# Patient Record
Sex: FEMALE | Race: OTHER | HISPANIC OR LATINO
[De-identification: names, ages, dates, MRNs, and addresses within clinical notes are randomized per-mention and may not be internally consistent; named-entity substitution may affect disease eponyms.]

---

## 2019-06-05 ENCOUNTER — APPOINTMENT (OUTPATIENT)
Dept: FAMILY MEDICINE | Facility: CLINIC | Age: 54
End: 2019-06-05
Payer: COMMERCIAL

## 2019-06-05 VITALS
HEIGHT: 62 IN | DIASTOLIC BLOOD PRESSURE: 80 MMHG | WEIGHT: 156 LBS | SYSTOLIC BLOOD PRESSURE: 120 MMHG | BODY MASS INDEX: 28.71 KG/M2

## 2019-06-05 DIAGNOSIS — Z78.9 OTHER SPECIFIED HEALTH STATUS: ICD-10-CM

## 2019-06-05 PROCEDURE — 99058 OFFICE EMERGENCY CARE: CPT

## 2019-06-05 PROCEDURE — 99213 OFFICE O/P EST LOW 20 MIN: CPT | Mod: 25

## 2019-06-05 RX ORDER — UBIDECARENONE/VIT E ACET 100MG-5
25 MCG CAPSULE ORAL
Refills: 0 | Status: ACTIVE | COMMUNITY

## 2019-06-05 RX ORDER — UBIDECARENONE/VIT E ACET 100MG-5
25 MCG CAPSULE ORAL
Refills: 0 | Status: COMPLETED | COMMUNITY
End: 2019-06-05

## 2019-06-05 NOTE — PHYSICAL EXAM
[No Acute Distress] : no acute distress [Well Nourished] : well nourished [Well Developed] : well developed [Well-Appearing] : well-appearing [Normal Sclera/Conjunctiva] : normal sclera/conjunctiva [PERRL] : pupils equal round and reactive to light [EOMI] : extraocular movements intact [Normal Outer Ear/Nose] : the outer ears and nose were normal in appearance [No Respiratory Distress] : no respiratory distress  [Clear to Auscultation] : lungs were clear to auscultation bilaterally [No Accessory Muscle Use] : no accessory muscle use [Normal Rate] : normal rate  [Regular Rhythm] : with a regular rhythm [Normal S1, S2] : normal S1 and S2 [Normal Gait] : normal gait [de-identified] : Pt has what appears to be .5-1cm insect bites on her upper back, more concentrated on her left back, no comedomes present

## 2019-06-05 NOTE — ASSESSMENT
[FreeTextEntry1] : Pt is a 55 yo F with c/o of itchy spots on her back that started 3 days ago. This is likely 2/2 to insect bites due to the appearance of the spots and the fact that they are pruritic.

## 2019-06-05 NOTE — REVIEW OF SYSTEMS
[Constipation] : constipation [Itching] : Itching [Skin Rash] : skin rash [Negative] : Heme/Lymph [FreeTextEntry7] : pt complains of chronic bloating and constipation

## 2019-06-05 NOTE — HISTORY OF PRESENT ILLNESS
[FreeTextEntry8] : 55 yo F presents with c/o of a rash on her back that is itchy. Pt says she noticed that a spot on her left shoulder began to itch 3 days ago and over the course of the next few days several more spots began to itch. When her  looked, he saw several small spots on her back. The pt says that she has been unable to sleep due to how itchy the spots have been. Pt denies any recent changes to her detergent or lotion. Pt denies any hotel stays. Pt denies seeing any insects. No other family members have similar complaints.

## 2019-06-05 NOTE — PLAN
[FreeTextEntry1] : Pt should investigate possible sources of the insects in her house. \par Take hydroxyzine as prescribed to alleviate pruritus.

## 2019-06-06 ENCOUNTER — RECORD ABSTRACTING (OUTPATIENT)
Age: 54
End: 2019-06-06

## 2019-06-06 DIAGNOSIS — Z98.890 OTHER SPECIFIED POSTPROCEDURAL STATES: ICD-10-CM

## 2019-06-19 ENCOUNTER — APPOINTMENT (OUTPATIENT)
Dept: FAMILY MEDICINE | Facility: CLINIC | Age: 54
End: 2019-06-19

## 2019-07-11 ENCOUNTER — NON-APPOINTMENT (OUTPATIENT)
Age: 54
End: 2019-07-11

## 2019-07-11 ENCOUNTER — APPOINTMENT (OUTPATIENT)
Dept: FAMILY MEDICINE | Facility: CLINIC | Age: 54
End: 2019-07-11
Payer: COMMERCIAL

## 2019-07-11 VITALS
DIASTOLIC BLOOD PRESSURE: 62 MMHG | SYSTOLIC BLOOD PRESSURE: 122 MMHG | BODY MASS INDEX: 27.6 KG/M2 | OXYGEN SATURATION: 97 % | WEIGHT: 150 LBS | HEART RATE: 71 BPM | HEIGHT: 62 IN
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
## 2019-07-11 DIAGNOSIS — R21 RASH AND OTHER NONSPECIFIC SKIN ERUPTION: ICD-10-CM

## 2019-07-11 PROCEDURE — 99396 PREV VISIT EST AGE 40-64: CPT | Mod: 25

## 2019-07-11 PROCEDURE — 93000 ELECTROCARDIOGRAM COMPLETE: CPT

## 2019-07-11 PROCEDURE — 36415 COLL VENOUS BLD VENIPUNCTURE: CPT

## 2019-07-11 RX ORDER — LEVOTHYROXINE SODIUM 100 UG/1
100 TABLET ORAL
Refills: 0 | Status: DISCONTINUED | COMMUNITY
End: 2019-07-11

## 2019-07-11 RX ORDER — METHYLPREDNISOLONE 4 MG/1
4 TABLET ORAL
Refills: 0 | Status: DISCONTINUED | COMMUNITY
End: 2019-07-11

## 2019-07-11 RX ORDER — AZITHROMYCIN DIHYDRATE 250 MG/1
250 TABLET, FILM COATED ORAL
Refills: 0 | Status: DISCONTINUED | COMMUNITY
End: 2019-07-11

## 2019-07-11 NOTE — HISTORY OF PRESENT ILLNESS
[FreeTextEntry1] : Annual examination\par Dyspepsia [de-identified] : Reports episodes of abdominal bloating\par also necK and right shoulder pain

## 2019-07-11 NOTE — HEALTH RISK ASSESSMENT
[No falls in past year] : Patient reported no falls in the past year [With Family] : lives with family [Unemployed] : unemployed [] :  [High School] : high school [Sexually Active] : sexually active [Feels Safe at Home] : Feels safe at home [0] : 2) Feeling down, depressed, or hopeless: Not at all (0) [Patient reported PAP Smear was normal] : Patient reported PAP Smear was normal [HIV test declined] : HIV test declined [Hepatitis C test declined] : Hepatitis C test declined [Very Good] : ~his/her~  mood as very good [] : No [Yes] : Yes [2 - 4 times a month (2 pts)] : 2-4 times a month (2 points) [1 or 2 (0 pts)] : 1 or 2 (0 points) [[id number]] : 0 [Patient reported colonoscopy was normal] : Patient reported colonoscopy was normal [PapSmearDate] : 2018 [MammogramDate] : 2018 [ColonoscopyDate] : 2016 [ColonoscopyComments] : [facility] [Patient/Caregiver not ready to engage] : Patient/Caregiver not ready to engage

## 2019-07-11 NOTE — PHYSICAL EXAM
[No Acute Distress] : no acute distress [Well Nourished] : well nourished [Well Developed] : well developed [Well-Appearing] : well-appearing [Normal Sclera/Conjunctiva] : normal sclera/conjunctiva [PERRL] : pupils equal round and reactive to light [EOMI] : extraocular movements intact [Normal Outer Ear/Nose] : the outer ears and nose were normal in appearance [Normal Oropharynx] : the oropharynx was normal [No Lymphadenopathy] : no lymphadenopathy [No JVD] : no jugular venous distention [Supple] : supple [Thyroid Normal, No Nodules] : the thyroid was normal and there were no nodules present [No Respiratory Distress] : no respiratory distress  [Clear to Auscultation] : lungs were clear to auscultation bilaterally [No Accessory Muscle Use] : no accessory muscle use [Normal Rate] : normal rate  [Regular Rhythm] : with a regular rhythm [No Murmur] : no murmur heard [Normal S1, S2] : normal S1 and S2 [No Abdominal Bruit] : a ~M bruit was not heard ~T in the abdomen [No Carotid Bruits] : no carotid bruits [Pedal Pulses Present] : the pedal pulses are present [No Varicosities] : no varicosities [No Edema] : there was no peripheral edema [No Palpable Aorta] : no palpable aorta [No Extremity Clubbing/Cyanosis] : no extremity clubbing/cyanosis [Soft] : abdomen soft [Non Tender] : non-tender [Non-distended] : non-distended [No HSM] : no HSM [No Masses] : no abdominal mass palpated [Normal Posterior Cervical Nodes] : no posterior cervical lymphadenopathy [Normal Bowel Sounds] : normal bowel sounds [Normal Anterior Cervical Nodes] : no anterior cervical lymphadenopathy [No CVA Tenderness] : no CVA  tenderness [No Joint Swelling] : no joint swelling [No Spinal Tenderness] : no spinal tenderness [Grossly Normal Strength/Tone] : grossly normal strength/tone [No Rash] : no rash [No Focal Deficits] : no focal deficits [Coordination Grossly Intact] : coordination grossly intact [Normal Gait] : normal gait [Deep Tendon Reflexes (DTR)] : deep tendon reflexes were 2+ and symmetric [Normal Affect] : the affect was normal [Normal Insight/Judgement] : insight and judgment were intact

## 2019-07-11 NOTE — PLAN
[FreeTextEntry1] : Overall unremarkable examination\par recommended to see GI, likely will need upper endoscopy\par Will contact the patient with blood test results

## 2019-07-12 LAB
25(OH)D3 SERPL-MCNC: 46.3 NG/ML
ALBUMIN SERPL ELPH-MCNC: 4.2 G/DL
ALP BLD-CCNC: 103 U/L
ALT SERPL-CCNC: 14 U/L
ANION GAP SERPL CALC-SCNC: 11 MMOL/L
AST SERPL-CCNC: 16 U/L
BASOPHILS # BLD AUTO: 0.02 K/UL
BASOPHILS NFR BLD AUTO: 0.4 %
BILIRUB SERPL-MCNC: 0.3 MG/DL
BUN SERPL-MCNC: 12 MG/DL
CALCIUM SERPL-MCNC: 9.7 MG/DL
CHLORIDE SERPL-SCNC: 105 MMOL/L
CHOLEST SERPL-MCNC: 179 MG/DL
CHOLEST/HDLC SERPL: 3.4 RATIO
CO2 SERPL-SCNC: 25 MMOL/L
CREAT SERPL-MCNC: 0.69 MG/DL
EOSINOPHIL # BLD AUTO: 0.07 K/UL
EOSINOPHIL NFR BLD AUTO: 1.5 %
GLUCOSE SERPL-MCNC: 93 MG/DL
HCT VFR BLD CALC: 39.3 %
HDLC SERPL-MCNC: 53 MG/DL
HGB BLD-MCNC: 12.7 G/DL
IMM GRANULOCYTES NFR BLD AUTO: 0.2 %
LDLC SERPL CALC-MCNC: 104 MG/DL
LYMPHOCYTES # BLD AUTO: 2.14 K/UL
LYMPHOCYTES NFR BLD AUTO: 45.1 %
MAN DIFF?: NORMAL
MCHC RBC-ENTMCNC: 29.4 PG
MCHC RBC-ENTMCNC: 32.3 GM/DL
MCV RBC AUTO: 91 FL
MONOCYTES # BLD AUTO: 0.44 K/UL
MONOCYTES NFR BLD AUTO: 9.3 %
NEUTROPHILS # BLD AUTO: 2.07 K/UL
NEUTROPHILS NFR BLD AUTO: 43.5 %
PLATELET # BLD AUTO: 258 K/UL
POTASSIUM SERPL-SCNC: 4 MMOL/L
PROT SERPL-MCNC: 7.2 G/DL
RBC # BLD: 4.32 M/UL
RBC # FLD: 13.2 %
SODIUM SERPL-SCNC: 141 MMOL/L
TRIGL SERPL-MCNC: 110 MG/DL
TSH SERPL-ACNC: 2.76 UIU/ML
VIT B12 SERPL-MCNC: 613 PG/ML
WBC # FLD AUTO: 4.75 K/UL

## 2019-07-17 ENCOUNTER — APPOINTMENT (OUTPATIENT)
Dept: FAMILY MEDICINE | Facility: CLINIC | Age: 54
End: 2019-07-17

## 2020-09-09 RX ORDER — PANTOPRAZOLE 40 MG/1
40 TABLET, DELAYED RELEASE ORAL
Qty: 30 | Refills: 5 | Status: ACTIVE | COMMUNITY
Start: 2020-01-17 | End: 1900-01-01

## 2021-01-14 ENCOUNTER — RESULT REVIEW (OUTPATIENT)
Age: 56
End: 2021-01-14

## 2021-01-27 ENCOUNTER — RESULT REVIEW (OUTPATIENT)
Age: 56
End: 2021-01-27

## 2021-07-10 ENCOUNTER — APPOINTMENT (OUTPATIENT)
Dept: FAMILY MEDICINE | Facility: CLINIC | Age: 56
End: 2021-07-10
Payer: MEDICAID

## 2021-07-10 VITALS
TEMPERATURE: 97.7 F | BODY MASS INDEX: 27.6 KG/M2 | SYSTOLIC BLOOD PRESSURE: 118 MMHG | WEIGHT: 150 LBS | OXYGEN SATURATION: 98 % | DIASTOLIC BLOOD PRESSURE: 60 MMHG | HEART RATE: 78 BPM | HEIGHT: 62 IN

## 2021-07-10 DIAGNOSIS — R79.9 ABNORMAL FINDING OF BLOOD CHEMISTRY, UNSPECIFIED: ICD-10-CM

## 2021-07-10 PROCEDURE — G0442 ANNUAL ALCOHOL SCREEN 15 MIN: CPT

## 2021-07-10 PROCEDURE — 99396 PREV VISIT EST AGE 40-64: CPT

## 2021-07-10 PROCEDURE — G0444 DEPRESSION SCREEN ANNUAL: CPT | Mod: 59

## 2021-07-10 NOTE — HISTORY OF PRESENT ILLNESS
[FreeTextEntry1] : Annual examination\par Dyspepsia [de-identified] : Reports episodes of abdominal bloating\par Lower back, also neck and right shoulder pain\par Completed Covid vaccination

## 2021-07-10 NOTE — HEALTH RISK ASSESSMENT
[Good] : ~his/her~  mood as  good [Yes] : Yes [2 - 4 times a month (2 pts)] : 2-4 times a month (2 points) [1 or 2 (0 pts)] : 1 or 2 (0 points) [No falls in past year] : Patient reported no falls in the past year [0] : 1) Little interest or pleasure doing things: Not at all (0) [1] : 2) Feeling down, depressed, or hopeless for several days (1) [Patient reported mammogram was normal] : Patient reported mammogram was normal [Patient reported PAP Smear was normal] : Patient reported PAP Smear was normal [Patient reported colonoscopy was normal] : Patient reported colonoscopy was normal [HIV test declined] : HIV test declined [Hepatitis C test declined] : Hepatitis C test declined [Fully functional (bathing, dressing, toileting, transferring, walking, feeding)] : Fully functional (bathing, dressing, toileting, transferring, walking, feeding) [Fully functional (using the telephone, shopping, preparing meals, housekeeping, doing laundry, using] : Fully functional and needs no help or supervision to perform IADLs (using the telephone, shopping, preparing meals, housekeeping, doing laundry, using transportation, managing medications and managing finances) [] : No [Audit-CScore] : 2 [FQQ3Astyh] : 1 [Reports changes in hearing] : Reports no changes in hearing [MammogramDate] : 01/21 [PapSmearDate] : 01/21

## 2021-07-12 LAB
25(OH)D3 SERPL-MCNC: 38.7 NG/ML
ALBUMIN SERPL ELPH-MCNC: 4.4 G/DL
ALP BLD-CCNC: 117 U/L
ALT SERPL-CCNC: 17 U/L
ANION GAP SERPL CALC-SCNC: 11 MMOL/L
AST SERPL-CCNC: 20 U/L
BASOPHILS # BLD AUTO: 0.03 K/UL
BASOPHILS NFR BLD AUTO: 0.7 %
BILIRUB SERPL-MCNC: 0.4 MG/DL
BUN SERPL-MCNC: 15 MG/DL
CALCIUM SERPL-MCNC: 9.4 MG/DL
CHLORIDE SERPL-SCNC: 107 MMOL/L
CHOLEST SERPL-MCNC: 197 MG/DL
CO2 SERPL-SCNC: 21 MMOL/L
CREAT SERPL-MCNC: 0.52 MG/DL
EOSINOPHIL # BLD AUTO: 0.07 K/UL
EOSINOPHIL NFR BLD AUTO: 1.7 %
ESTIMATED AVERAGE GLUCOSE: 108 MG/DL
GLUCOSE SERPL-MCNC: 95 MG/DL
HBA1C MFR BLD HPLC: 5.4 %
HCT VFR BLD CALC: 38.4 %
HDLC SERPL-MCNC: 55 MG/DL
HGB BLD-MCNC: 13 G/DL
IMM GRANULOCYTES NFR BLD AUTO: 0.2 %
LDLC SERPL CALC-MCNC: 126 MG/DL
LYMPHOCYTES # BLD AUTO: 2.22 K/UL
LYMPHOCYTES NFR BLD AUTO: 55.4 %
MAN DIFF?: NORMAL
MCHC RBC-ENTMCNC: 29.3 PG
MCHC RBC-ENTMCNC: 33.9 GM/DL
MCV RBC AUTO: 86.7 FL
MONOCYTES # BLD AUTO: 0.36 K/UL
MONOCYTES NFR BLD AUTO: 9 %
NEUTROPHILS # BLD AUTO: 1.32 K/UL
NEUTROPHILS NFR BLD AUTO: 33 %
NONHDLC SERPL-MCNC: 142 MG/DL
PLATELET # BLD AUTO: 281 K/UL
POTASSIUM SERPL-SCNC: 4 MMOL/L
PROT SERPL-MCNC: 6.9 G/DL
RBC # BLD: 4.43 M/UL
RBC # FLD: 13.3 %
SODIUM SERPL-SCNC: 139 MMOL/L
TRIGL SERPL-MCNC: 81 MG/DL
TSH SERPL-ACNC: 1.35 UIU/ML
VIT B12 SERPL-MCNC: 599 PG/ML
WBC # FLD AUTO: 4.01 K/UL

## 2021-07-20 ENCOUNTER — APPOINTMENT (OUTPATIENT)
Dept: FAMILY MEDICINE | Facility: CLINIC | Age: 56
End: 2021-07-20
Payer: MEDICAID

## 2021-07-20 ENCOUNTER — RESULT REVIEW (OUTPATIENT)
Age: 56
End: 2021-07-20

## 2021-07-20 VITALS
DIASTOLIC BLOOD PRESSURE: 62 MMHG | RESPIRATION RATE: 16 BRPM | TEMPERATURE: 98 F | OXYGEN SATURATION: 98 % | HEART RATE: 76 BPM | SYSTOLIC BLOOD PRESSURE: 115 MMHG

## 2021-07-20 DIAGNOSIS — R05 COUGH: ICD-10-CM

## 2021-07-20 PROCEDURE — 99214 OFFICE O/P EST MOD 30 MIN: CPT

## 2021-07-20 RX ORDER — PANTOPRAZOLE 40 MG/1
40 TABLET, DELAYED RELEASE ORAL
Qty: 30 | Refills: 2 | Status: ACTIVE | COMMUNITY
Start: 2021-07-20 | End: 1900-01-01

## 2021-07-20 RX ORDER — GUAIFENESIN 600 MG/1
600 TABLET, EXTENDED RELEASE ORAL
Qty: 14 | Refills: 0 | Status: ACTIVE | COMMUNITY
Start: 2021-07-20 | End: 1900-01-01

## 2021-07-20 NOTE — HISTORY OF PRESENT ILLNESS
[FreeTextEntry1] : Persistent cough\par fixing her voice [de-identified] : Results of recent blood work discussed with the patient\par Complaining of persistent cough\par Has a history of GERD

## 2021-07-20 NOTE — PLAN
[FreeTextEntry1] : Recommended diet and life style changes\par Get Xray to r/o another causes of cough\par Recommended ENT consult\par Call if not well\par

## 2021-12-26 ENCOUNTER — NON-APPOINTMENT (OUTPATIENT)
Age: 56
End: 2021-12-26

## 2022-01-03 ENCOUNTER — APPOINTMENT (OUTPATIENT)
Dept: GASTROENTEROLOGY | Facility: CLINIC | Age: 57
End: 2022-01-03
Payer: MEDICAID

## 2022-01-03 VITALS
BODY MASS INDEX: 26.7 KG/M2 | DIASTOLIC BLOOD PRESSURE: 62 MMHG | WEIGHT: 146 LBS | HEART RATE: 74 BPM | SYSTOLIC BLOOD PRESSURE: 102 MMHG

## 2022-01-03 DIAGNOSIS — K21.9 GASTRO-ESOPHAGEAL REFLUX DISEASE W/OUT ESOPHAGITIS: ICD-10-CM

## 2022-01-03 DIAGNOSIS — R14.0 ABDOMINAL DISTENSION (GASEOUS): ICD-10-CM

## 2022-01-03 DIAGNOSIS — K64.8 OTHER HEMORRHOIDS: ICD-10-CM

## 2022-01-03 DIAGNOSIS — Z12.11 ENCOUNTER FOR SCREENING FOR MALIGNANT NEOPLASM OF COLON: ICD-10-CM

## 2022-01-03 PROCEDURE — 99204 OFFICE O/P NEW MOD 45 MIN: CPT

## 2022-01-03 NOTE — HISTORY OF PRESENT ILLNESS
[de-identified] : \par  \par This HPI reflects a summary and review of records : including previous and most recent  Labs, body imaging, consults and progress notes, operative and pathology reports, EKG reports, ED records, found in [employer], [employer],  [employer] and any additional records brought in by  the patient at the time of the visit.\par \par \par \par PCP: Donna\par \par 55 yo F w h/o Hypothyroidism\par GERD w ? LPR\par \par + H/O Snoring,  + DTF,  STOP-Bang=3 ,  M[place]ampatti=3/4\par \par Hemorrhoids\par 7/20/21 Dr. Hebert: c/o persistent cough and voice changes, Rx w pantop 40mg qd\par Referral to ENT\par prior visit c/o abd bloating, rec GI eval but pt never scheduled \par \par \par 1/3/22: Today:    Today:  Feeling well, no c/o , CP, SOB/ CASANOVA, Cough, Wheeze, Palpitations, edema\par \par   Most recent labs and reviewed w patient: cbc, cmet, tsh, HgbA1c,LDL  ,Vit. D--wnl (7/2021)\par \par \par 1/3/22   Today: pt notes intermittent B/L LBP, usually assoc w constipation , bloat, flatulance\par               BMs: usu # 3-4, 3 q days w strain, bloat\par               No rectal bleeding\par               Dietary log: B: eggs, avacodo and coffee, then Dinner ~ 4pm: protein, rice & veg\par                drinks little water, no reg aerobic exercise\par \par * Abd pain-->no\par * Nausea--> no\par * Vomit--> no\par * Early satiety--> no\par * Belching--> yes\par * Hiccups--> no\par * Regurgitation--> no\par * Acid Taste / Water Brash--> no\par * Ht burn--> intermittent \par * Dysphagia--> no\par * Throat Clearing--> no\par * Hoarseness--> no\par * Post-Nasal Drip--> no\par * Congestion--> no\par * Globus--> no\par * Cough--> no\par * Wheeze / PC-> -no\par * Diarrhea--> no\par * Strain on Defecation--> yes\par * Incompl Evac--> no\par * Flatulence--> yes\par * Gurgling--> no\par * Melena--> no\par * BPBPR-> -no\par * Anorexia--> no\par * Wt. Loss--> no\par \par \par

## 2022-01-03 NOTE — REVIEW OF SYSTEMS
[Red Eyes] : eyes not red [Dysuria] : no dysuria [Skin Lesions] : no skin lesions [Confused] : no confusion [Proptosis] : no proptosis [Easy Bruising] : no tendency for easy bruising

## 2022-01-03 NOTE — ASSESSMENT
[FreeTextEntry1] : \par \par \par 1. GERD:  well  -  controlled,  no ht burn,  dysphagia,  throat clear\par *  probable  LPR,  No  Bishop’s  ,  or h/o  Esophagitis grade: A--  was found \par \par Recommend: \par * Anti-reflux diet & life-style changes reviewed & re-emphasized.  \par * HOB elevation /  Bedge use emphasized\par * Weight reduction & regular exercise emphasized\par \par * ++ PPI use :  Pantop 40mg       --  as needed was emphasized\par No need for  H2B q HS:  \par No need for Carafate  1 gram \par I previously reviewed & summarized the prospective randomized & retrospective non-randomized studies\par looking at potential long term SE's of PPIs, w special attention to associations & actual cause\par as related to GI infections, bone loss, cognitive changes, KD, Covid, vitamin & electrolyte deficiencies\par questions were answered, pt advised that PPIs should be used when needed as indicated by their\par clinical indication and response and tapering off is always the goal if possible. pt understood.\par \par * No  need for pH Monitor,  Manometry, or  Esophagram \par * No  need for ENT  eval/F/U, \par * No  need for  Surgical  eval  \par \par * for  F/U  EGD: --for Barretts screening / surveillance needed \par \par \par \par \par \par 2. Constipation:\par assoc w bloat \par CIC vs  Essential vs Functional Bowel D/O \par \par Recommend: \par * Diet: moderate  Fiber,  Low Fat & Lactose free, Low FODMAPs--was reviewed & emphasized\par * Daily fluid intake was reviewed : 6  --  8 cups of decaffeinated fluid daily was emphasized\par * Regular aerobic exercise was emphasized\par * Probiotics  1  daily\par * Miralax 1 cap q am  presently required\par [place] 1 Tbsp q am \par \par \par \par \par \par 3. Hemorrhoids:  well - controlled.  No pain,  swelling,  itch,  bleeding\par * Discussed previously the  potential complications of thrombosis,  pain,  infection,  swelling, itching,  bleeding --none recently\par Recommendations: \par * Moderate-  Fiber Diet was reviewed and emphasized\par * 6  --  8 cups of decaffeinated fluid daily was emphasized \par * Sitz Bathes as needed ,  No:  Anusol HC  Suppos / Cream  DE BID -- was needed\par * No:  Tucks BID,  Balneol Lotion,   Calmoseptine Oint -- was needed ;    can use  Prep H prn\par * No:  need for  Colorectal surgical evaluation for possible ablation \par \par \par \par \par \par 4. Colorectal   Neoplasia  Screening:  to be evaluated\par   Utilizing teaching posters and anatomical models the following were discussed and emphasized with the patient in detail: \par * Discussed the pre-malignant potential of polyps\par * Discussed the importance of f/u surveillance / screening colonoscopy \par * moderate- Fiber Diet was reviewed and emphasized\par * Anti-oxidants and ASA/NSAID Therapy emphasized\par * Given age > 40-51 yo\par * Recommend Colonoscopy  to  R/O  Colonic Neoplasia-- in 2021\par \par \par \par \par Informed Consent:\par * The risks & Benefits of  Colonoscopy were discussed w patient.\par * This included but was not limited to perforation, bleeding, sedation /med rxns possibly requiring surgery, blood transfusions, antibiotics & CPR/Intubation.\par * Pt. understands & agrees to the procedures.\par The following instructions in regards to the prep and medically essential ( cardiac, pulmonary, sz, psych, endocrine)  pre-op medication administration\par was reviewed and emphasized with the patient . \par * Pt. advised to D/C  ASA/NSAIDs  7  Days  PTP.\par * [ +++ ]  Dulcolax / Miralax / Mag. Citrate ,  [     ] Prepopik/ Clenpiq ,  [     ] Osmo Prep,  [    ] GoLytely,  prep. reviewed w Pt.\par * Hold  [           ] AM of procedure.\par * Hold  [           ] PM  before procedure.\par * Take  [           ] PM  before procedure.\par * Take  [           ] AM of procedure.\par \par

## 2022-01-13 ENCOUNTER — RESULT REVIEW (OUTPATIENT)
Age: 57
End: 2022-01-13

## 2022-02-15 ENCOUNTER — RESULT REVIEW (OUTPATIENT)
Age: 57
End: 2022-02-15

## 2022-02-16 ENCOUNTER — APPOINTMENT (OUTPATIENT)
Dept: GASTROENTEROLOGY | Facility: HOSPITAL | Age: 57
End: 2022-02-16

## 2022-03-07 ENCOUNTER — APPOINTMENT (OUTPATIENT)
Dept: OBGYN | Facility: CLINIC | Age: 57
End: 2022-03-07
Payer: MEDICAID

## 2022-03-07 VITALS
DIASTOLIC BLOOD PRESSURE: 60 MMHG | BODY MASS INDEX: 27.05 KG/M2 | WEIGHT: 147 LBS | HEIGHT: 62 IN | SYSTOLIC BLOOD PRESSURE: 92 MMHG

## 2022-03-07 DIAGNOSIS — Z01.419 ENCOUNTER FOR GYNECOLOGICAL EXAMINATION (GENERAL) (ROUTINE) W/OUT ABNORMAL FINDINGS: ICD-10-CM

## 2022-03-07 PROCEDURE — 99386 PREV VISIT NEW AGE 40-64: CPT

## 2022-03-07 NOTE — HISTORY OF PRESENT ILLNESS
[TextBox_4] : 56 year old P2 for annual exam. No complaints. No postmenopausal bleeding. No history of cervical dysplasia. Up to date with breast ca screening, planning for colonoscopy soon. Not sexually active,  is well into his 70s. Helps manage real estate investment properties.

## 2022-03-23 LAB
CYTOLOGY CVX/VAG DOC THIN PREP: ABNORMAL
HPV HIGH+LOW RISK DNA PNL CVX: NOT DETECTED

## 2022-10-18 ENCOUNTER — APPOINTMENT (OUTPATIENT)
Dept: FAMILY MEDICINE | Facility: CLINIC | Age: 57
End: 2022-10-18

## 2022-10-18 VITALS
RESPIRATION RATE: 16 BRPM | HEART RATE: 76 BPM | WEIGHT: 146 LBS | TEMPERATURE: 98.6 F | DIASTOLIC BLOOD PRESSURE: 68 MMHG | SYSTOLIC BLOOD PRESSURE: 100 MMHG | HEIGHT: 60 IN | BODY MASS INDEX: 28.66 KG/M2

## 2022-10-18 DIAGNOSIS — Z00.00 ENCOUNTER FOR GENERAL ADULT MEDICAL EXAMINATION W/OUT ABNORMAL FINDINGS: ICD-10-CM

## 2022-10-18 DIAGNOSIS — E55.9 VITAMIN D DEFICIENCY, UNSPECIFIED: ICD-10-CM

## 2022-10-18 DIAGNOSIS — Z01.84 ENCOUNTER FOR ANTIBODY RESPONSE EXAMINATION: ICD-10-CM

## 2022-10-18 DIAGNOSIS — K59.09 OTHER CONSTIPATION: ICD-10-CM

## 2022-10-18 PROCEDURE — 36415 COLL VENOUS BLD VENIPUNCTURE: CPT

## 2022-10-18 PROCEDURE — G0444 DEPRESSION SCREEN ANNUAL: CPT | Mod: 59

## 2022-10-18 PROCEDURE — 99396 PREV VISIT EST AGE 40-64: CPT | Mod: 25

## 2022-10-18 NOTE — HEALTH RISK ASSESSMENT
[Good] : ~his/her~ current health as good [Fair] :  ~his/her~ mood as fair [Never] : Never [Yes] : Yes [Monthly or less (1 pt)] : Monthly or less (1 point) [1 or 2 (0 pts)] : 1 or 2 (0 points) [Never (0 pts)] : Never (0 points) [No] : In the past 12 months have you used drugs other than those required for medical reasons? No [No falls in past year] : Patient reported no falls in the past year [0] : 2) Feeling down, depressed, or hopeless: Not at all (0) [Patient reported mammogram was normal] : Patient reported mammogram was normal [Patient reported colonoscopy was normal] : Patient reported colonoscopy was normal [HIV test declined] : HIV test declined [Hepatitis C test declined] : Hepatitis C test declined [None] : None [With Family] : lives with family [# of Members in Household ___] :  household currently consist of [unfilled] member(s) [Unemployed] : unemployed [College] : College [] :  [# Of Children ___] : has [unfilled] children [Sexually Active] : sexually active [Feels Safe at Home] : Feels safe at home [Fully functional (bathing, dressing, toileting, transferring, walking, feeding)] : Fully functional (bathing, dressing, toileting, transferring, walking, feeding) [Fully functional (using the telephone, shopping, preparing meals, housekeeping, doing laundry, using] : Fully functional and needs no help or supervision to perform IADLs (using the telephone, shopping, preparing meals, housekeeping, doing laundry, using transportation, managing medications and managing finances) [Reports normal functional visual acuity (ie: able to read med bottle)] : Reports normal functional visual acuity [Smoke Detector] : smoke detector [Carbon Monoxide Detector] : carbon monoxide detector [Safety elements used in home] : safety elements used in home [Seat Belt] :  uses seat belt [Sunscreen] : uses sunscreen [FreeTextEntry1] : Shingles vaccine [de-identified] : None [de-identified] : GYN [de-identified] : Walking [de-identified] : All inclusive [Change in mental status noted] : No change in mental status noted [Language] : denies difficulty with language [Behavior] : denies difficulty with behavior [Learning/Retaining New Information] : denies difficulty learning/retaining new information [Handling Complex Tasks] : denies difficulty handling complex tasks [Reasoning] : denies difficulty with reasoning [Spatial Ability and Orientation] : denies difficulty with spatial ability and orientation [High Risk Behavior] : no high risk behavior [Reports changes in hearing] : Reports no changes in hearing [Reports changes in vision] : Reports no changes in vision [Reports changes in dental health] : Reports no changes in dental health [Guns at Home] : no guns at home [Travel to Developing Areas] : does not  travel to developing areas [TB Exposure] : is not being exposed to tuberculosis [Caregiver Concerns] : does not have caregiver concerns [MammogramDate] : 01/22 [PapSmearDate] : 03/22 [PapSmearComments] : unable to process [ColonoscopyDate] : 02/22 [AdvancecareDate] : 10/22

## 2022-10-18 NOTE — PLAN
[FreeTextEntry1] : refused flu shot\par Considering Shingrix\par Refer GYN\par will contact the patient  with results

## 2022-10-18 NOTE — HISTORY OF PRESENT ILLNESS
[FreeTextEntry1] : Annual exam\par Abn PAP (?) [de-identified] : overall in good health\par reportan abnormal PAP in the past\par No proper compliance with f/u

## 2022-10-19 LAB
25(OH)D3 SERPL-MCNC: 40 NG/ML
ALBUMIN SERPL ELPH-MCNC: 4.3 G/DL
ALP BLD-CCNC: 114 U/L
ALT SERPL-CCNC: 15 U/L
ANION GAP SERPL CALC-SCNC: 12 MMOL/L
AST SERPL-CCNC: 19 U/L
BASOPHILS # BLD AUTO: 0.03 K/UL
BASOPHILS NFR BLD AUTO: 0.7 %
BILIRUB SERPL-MCNC: 0.3 MG/DL
BUN SERPL-MCNC: 19 MG/DL
CALCIUM SERPL-MCNC: 9.2 MG/DL
CHLORIDE SERPL-SCNC: 108 MMOL/L
CHOLEST SERPL-MCNC: 196 MG/DL
CO2 SERPL-SCNC: 22 MMOL/L
CREAT SERPL-MCNC: 0.49 MG/DL
EGFR: 111 ML/MIN/1.73M2
EOSINOPHIL # BLD AUTO: 0.14 K/UL
EOSINOPHIL NFR BLD AUTO: 3.4 %
GLUCOSE SERPL-MCNC: 92 MG/DL
HBV SURFACE AB SER QL: NONREACTIVE
HBV SURFACE AG SER QL: NONREACTIVE
HCT VFR BLD CALC: 39 %
HCV AB SER QL: NONREACTIVE
HCV S/CO RATIO: 0.08 S/CO
HDLC SERPL-MCNC: 54 MG/DL
HGB BLD-MCNC: 12.7 G/DL
IMM GRANULOCYTES NFR BLD AUTO: 0.2 %
IRON SATN MFR SERPL: 29 %
IRON SERPL-MCNC: 82 UG/DL
LDLC SERPL CALC-MCNC: 127 MG/DL
LYMPHOCYTES # BLD AUTO: 1.99 K/UL
LYMPHOCYTES NFR BLD AUTO: 48.5 %
MAN DIFF?: NORMAL
MCHC RBC-ENTMCNC: 29.6 PG
MCHC RBC-ENTMCNC: 32.6 GM/DL
MCV RBC AUTO: 90.9 FL
MONOCYTES # BLD AUTO: 0.32 K/UL
MONOCYTES NFR BLD AUTO: 7.8 %
NEUTROPHILS # BLD AUTO: 1.61 K/UL
NEUTROPHILS NFR BLD AUTO: 39.4 %
NONHDLC SERPL-MCNC: 142 MG/DL
PLATELET # BLD AUTO: 254 K/UL
POTASSIUM SERPL-SCNC: 4.3 MMOL/L
PROT SERPL-MCNC: 6.8 G/DL
RBC # BLD: 4.29 M/UL
RBC # FLD: 13.8 %
SODIUM SERPL-SCNC: 143 MMOL/L
TIBC SERPL-MCNC: 285 UG/DL
TRIGL SERPL-MCNC: 78 MG/DL
TSH SERPL-ACNC: 0.95 UIU/ML
UIBC SERPL-MCNC: 203 UG/DL
VIT B12 SERPL-MCNC: 502 PG/ML
VZV AB TITR SER: POSITIVE
VZV IGG SER IF-ACNC: 690.1 INDEX
WBC # FLD AUTO: 4.1 K/UL

## 2022-11-29 DIAGNOSIS — Z23 ENCOUNTER FOR IMMUNIZATION: ICD-10-CM

## 2022-12-06 RX ORDER — ZOSTER VACCINE RECOMBINANT, ADJUVANTED 50 MCG/0.5
50 KIT INTRAMUSCULAR
Qty: 2 | Refills: 0 | Status: ACTIVE | COMMUNITY
Start: 2022-12-06 | End: 1900-01-01

## 2022-12-08 ENCOUNTER — APPOINTMENT (OUTPATIENT)
Dept: OBGYN | Facility: CLINIC | Age: 57
End: 2022-12-08

## 2022-12-08 VITALS — DIASTOLIC BLOOD PRESSURE: 70 MMHG | SYSTOLIC BLOOD PRESSURE: 120 MMHG | HEIGHT: 60 IN

## 2022-12-08 DIAGNOSIS — Z92.89 PERSONAL HISTORY OF OTHER MEDICAL TREATMENT: ICD-10-CM

## 2022-12-08 DIAGNOSIS — R87.615 UNSATISFACTORY CYTOLOGIC SMEAR OF CERVIX: ICD-10-CM

## 2022-12-08 NOTE — HISTORY OF PRESENT ILLNESS
[FreeTextEntry1] : 56 y/o P2 presents for repeat pap due to unsatisfactory results due to scant cellularity.

## 2022-12-08 NOTE — PHYSICAL EXAM
[Chaperone Declined] : Patient declined chaperone [Appropriately responsive] : appropriately responsive [Alert] : alert [No Acute Distress] : no acute distress [Oriented x3] : oriented x3 [No Lesions] : no lesions  [Labia Majora] : normal [Labia Minora] : normal [Atrophy] : atrophy [Normal] : normal

## 2022-12-08 NOTE — REVIEW OF SYSTEMS
[Negative] : Heme/Lymph Pt instructed to take Amlodipine AM of surgery with sip of water  ECHO report requested

## 2022-12-12 LAB — HPV HIGH+LOW RISK DNA PNL CVX: NOT DETECTED

## 2022-12-19 LAB — CYTOLOGY CVX/VAG DOC THIN PREP: ABNORMAL

## 2023-01-17 ENCOUNTER — RESULT REVIEW (OUTPATIENT)
Age: 58
End: 2023-01-17

## 2023-06-28 ENCOUNTER — APPOINTMENT (OUTPATIENT)
Dept: FAMILY MEDICINE | Facility: CLINIC | Age: 58
End: 2023-06-28
Payer: MEDICAID

## 2023-06-28 VITALS
DIASTOLIC BLOOD PRESSURE: 80 MMHG | TEMPERATURE: 98.6 F | OXYGEN SATURATION: 98 % | BODY MASS INDEX: 27.48 KG/M2 | HEART RATE: 65 BPM | WEIGHT: 140 LBS | HEIGHT: 60 IN | SYSTOLIC BLOOD PRESSURE: 104 MMHG | RESPIRATION RATE: 16 BRPM

## 2023-06-28 DIAGNOSIS — E03.9 HYPOTHYROIDISM, UNSPECIFIED: ICD-10-CM

## 2023-06-28 DIAGNOSIS — Z86.39 PERSONAL HISTORY OF OTHER ENDOCRINE, NUTRITIONAL AND METABOLIC DISEASE: ICD-10-CM

## 2023-06-28 PROCEDURE — 99213 OFFICE O/P EST LOW 20 MIN: CPT | Mod: 25

## 2023-06-28 NOTE — PLAN
[FreeTextEntry1] : Blood collected in the office\par Will contact the patient with results and will decide the amount of levothyroxine needed\par Will repeat the US of thyroid due  to confusing reports of thyroid nodule

## 2023-06-28 NOTE — HISTORY OF PRESENT ILLNESS
[FreeTextEntry1] : Hypothyroidism\par Thyroid nodules [de-identified] : Use to see an endocrinologist not affiliated with [facility]\par Reports a history of ' Thyroid nodule" not visualised on anThyroid US amna Escobar in Dec 2021

## 2023-06-29 LAB
THYROGLOB AB SERPL-ACNC: <20 IU/ML
THYROPEROXIDASE AB SERPL IA-ACNC: 702 IU/ML

## 2023-07-02 LAB
ANION GAP SERPL CALC-SCNC: 16 MMOL/L
BUN SERPL-MCNC: 15 MG/DL
CALCIUM SERPL-MCNC: 9.6 MG/DL
CALCIUM SERPL-MCNC: 9.6 MG/DL
CHLORIDE SERPL-SCNC: 105 MMOL/L
CO2 SERPL-SCNC: 20 MMOL/L
CREAT SERPL-MCNC: 0.54 MG/DL
EGFR: 107 ML/MIN/1.73M2
GLUCOSE SERPL-MCNC: 93 MG/DL
PARATHYROID HORMONE INTACT: 28 PG/ML
POTASSIUM SERPL-SCNC: 4.1 MMOL/L
SODIUM SERPL-SCNC: 141 MMOL/L
T3 SERPL-MCNC: 90 NG/DL
T3FREE SERPL-MCNC: 2.83 PG/ML
T4 FREE SERPL-MCNC: 1.6 NG/DL
T4 SERPL-MCNC: 8.4 UG/DL
TSH SERPL-ACNC: 1.92 UIU/ML

## 2023-07-10 ENCOUNTER — RESULT REVIEW (OUTPATIENT)
Age: 58
End: 2023-07-10

## 2024-01-30 ENCOUNTER — RESULT REVIEW (OUTPATIENT)
Age: 59
End: 2024-01-30

## 2024-02-23 ENCOUNTER — NON-APPOINTMENT (OUTPATIENT)
Age: 59
End: 2024-02-23

## 2024-02-25 ENCOUNTER — RX RENEWAL (OUTPATIENT)
Age: 59
End: 2024-02-25

## 2024-02-25 RX ORDER — LEVOTHYROXINE SODIUM 88 UG/1
88 TABLET ORAL DAILY
Qty: 90 | Refills: 0 | Status: ACTIVE | COMMUNITY
Start: 2019-04-22 | End: 1900-01-01

## 2025-01-22 ENCOUNTER — RESULT REVIEW (OUTPATIENT)
Age: 60
End: 2025-01-22